# Patient Record
Sex: MALE | Race: WHITE | NOT HISPANIC OR LATINO | Employment: FULL TIME | URBAN - METROPOLITAN AREA
[De-identification: names, ages, dates, MRNs, and addresses within clinical notes are randomized per-mention and may not be internally consistent; named-entity substitution may affect disease eponyms.]

---

## 2021-04-08 DIAGNOSIS — Z23 ENCOUNTER FOR IMMUNIZATION: ICD-10-CM

## 2022-04-19 ENCOUNTER — CONSULT (OUTPATIENT)
Dept: INFECTIOUS DISEASES | Facility: CLINIC | Age: 57
End: 2022-04-19

## 2022-04-19 VITALS
SYSTOLIC BLOOD PRESSURE: 118 MMHG | OXYGEN SATURATION: 97 % | BODY MASS INDEX: 28 KG/M2 | HEIGHT: 71 IN | DIASTOLIC BLOOD PRESSURE: 72 MMHG | HEART RATE: 85 BPM | TEMPERATURE: 97.7 F | RESPIRATION RATE: 18 BRPM | WEIGHT: 200 LBS

## 2022-04-19 DIAGNOSIS — Z71.84 TRAVEL ADVICE ENCOUNTER: Primary | ICD-10-CM

## 2022-04-19 PROCEDURE — 90471 IMMUNIZATION ADMIN: CPT

## 2022-04-19 PROCEDURE — 90717 YELLOW FEVER VACCINE SUBQ: CPT

## 2022-04-19 PROCEDURE — 90691 TYPHOID VACCINE IM: CPT

## 2022-04-19 RX ORDER — CHLORAL HYDRATE 500 MG
2 CAPSULE ORAL DAILY
COMMUNITY
Start: 2021-12-08

## 2022-04-19 RX ORDER — LOSARTAN POTASSIUM 25 MG/1
TABLET ORAL
COMMUNITY
Start: 2022-03-18

## 2022-04-19 RX ORDER — CALCIPOTRIENE 50 UG/G
CREAM TOPICAL
COMMUNITY
Start: 2022-02-09

## 2022-04-19 RX ORDER — FENOFIBRATE 54 MG/1
TABLET ORAL
COMMUNITY

## 2022-04-19 RX ORDER — PANTOPRAZOLE SODIUM 40 MG/1
TABLET, DELAYED RELEASE ORAL
COMMUNITY
Start: 2022-03-15

## 2022-04-19 RX ORDER — BIMATOPROST 0.01 %
1 DROPS OPHTHALMIC (EYE) DAILY
COMMUNITY
Start: 2022-02-23

## 2022-04-19 RX ORDER — EPINEPHRINE 0.3 MG/.3ML
INJECTION SUBCUTANEOUS
COMMUNITY
Start: 2011-06-27

## 2022-04-19 RX ORDER — PREDNISONE 20 MG/1
40 TABLET ORAL 2 TIMES DAILY
COMMUNITY
Start: 2022-04-12

## 2022-04-19 NOTE — PROGRESS NOTES
[facility]    Patient is traveling to countries or areas within countries where immunizations are required or recommended:   [employer] [employer]    Patient states they will visit underdeveloped areas with poor sanitation Yes/No: Yes   Patient requires malaria prophylaxis Yes/No: Yes    No orders of the defined types were placed in this encounter    Needs only Yellow Fever and Typhoid    Patient instructed how to take medications Yes/No: Yes  Patient warned about side effects Yes/No: Yes  Patient declined

## 2024-06-08 ENCOUNTER — APPOINTMENT (EMERGENCY)
Dept: RADIOLOGY | Facility: HOSPITAL | Age: 59
End: 2024-06-08
Payer: COMMERCIAL

## 2024-06-08 ENCOUNTER — HOSPITAL ENCOUNTER (EMERGENCY)
Facility: HOSPITAL | Age: 59
Discharge: HOME | End: 2024-06-08
Attending: EMERGENCY MEDICINE
Payer: COMMERCIAL

## 2024-06-08 VITALS
SYSTOLIC BLOOD PRESSURE: 127 MMHG | OXYGEN SATURATION: 97 % | DIASTOLIC BLOOD PRESSURE: 71 MMHG | RESPIRATION RATE: 18 BRPM | TEMPERATURE: 98.1 F | HEART RATE: 70 BPM

## 2024-06-08 DIAGNOSIS — S61.209A AVULSION OF FINGER TIP, INITIAL ENCOUNTER: Primary | ICD-10-CM

## 2024-06-08 PROCEDURE — 96372 THER/PROPH/DIAG INJ SC/IM: CPT

## 2024-06-08 PROCEDURE — 90471 IMMUNIZATION ADMIN: CPT

## 2024-06-08 PROCEDURE — 99284 EMERGENCY DEPT VISIT MOD MDM: CPT | Performed by: EMERGENCY MEDICINE

## 2024-06-08 PROCEDURE — 90715 TDAP VACCINE 7 YRS/> IM: CPT | Performed by: EMERGENCY MEDICINE

## 2024-06-08 PROCEDURE — 99283 EMERGENCY DEPT VISIT LOW MDM: CPT

## 2024-06-08 PROCEDURE — 73140 X-RAY EXAM OF FINGER(S): CPT

## 2024-06-08 RX ORDER — KETOROLAC TROMETHAMINE 30 MG/ML
15 INJECTION, SOLUTION INTRAMUSCULAR; INTRAVENOUS ONCE
Status: COMPLETED | OUTPATIENT
Start: 2024-06-08 | End: 2024-06-08

## 2024-06-08 RX ORDER — OXYCODONE HYDROCHLORIDE 5 MG/1
5 TABLET ORAL ONCE
Status: COMPLETED | OUTPATIENT
Start: 2024-06-08 | End: 2024-06-08

## 2024-06-08 RX ORDER — CEPHALEXIN 250 MG/1
500 CAPSULE ORAL ONCE
Status: COMPLETED | OUTPATIENT
Start: 2024-06-08 | End: 2024-06-08

## 2024-06-08 RX ORDER — OXYCODONE HYDROCHLORIDE 5 MG/1
5 TABLET ORAL EVERY 6 HOURS PRN
Qty: 15 TABLET | Refills: 0 | Status: SHIPPED | OUTPATIENT
Start: 2024-06-08 | End: 2024-06-18

## 2024-06-08 RX ORDER — NAPROXEN 500 MG/1
500 TABLET ORAL 2 TIMES DAILY WITH MEALS
Qty: 30 TABLET | Refills: 0 | Status: SHIPPED | OUTPATIENT
Start: 2024-06-08

## 2024-06-08 RX ORDER — CEPHALEXIN 500 MG/1
500 CAPSULE ORAL EVERY 6 HOURS SCHEDULED
Qty: 28 CAPSULE | Refills: 0 | Status: SHIPPED | OUTPATIENT
Start: 2024-06-08 | End: 2024-06-15

## 2024-06-08 RX ADMIN — CEPHALEXIN 500 MG: 250 CAPSULE ORAL at 17:09

## 2024-06-08 RX ADMIN — KETOROLAC TROMETHAMINE 15 MG: 30 INJECTION, SOLUTION INTRAMUSCULAR; INTRAVENOUS at 17:02

## 2024-06-08 RX ADMIN — TETANUS TOXOID, REDUCED DIPHTHERIA TOXOID AND ACELLULAR PERTUSSIS VACCINE, ADSORBED 0.5 ML: 5; 2.5; 8; 8; 2.5 SUSPENSION INTRAMUSCULAR at 17:02

## 2024-06-08 RX ADMIN — OXYCODONE HYDROCHLORIDE 5 MG: 5 TABLET ORAL at 17:01

## 2024-06-08 NOTE — Clinical Note
David Apgar was seen and treated in our emergency department on 2024.            Light duty due to left hand injury    Diagnosis:     Paulino  .    He may return on this date:          If you have any questions or concerns, please don't hesitate to call.      Mattie Maldonado, DO    ______________________________           _______________          _______________  Hospital Representative                              Date                                Time

## 2024-06-08 NOTE — DISCHARGE INSTRUCTIONS
Keep your dressing in place, and keep it clean and dry.  If your dressing comes off, rewrap it with the petroleum gauze, then regular gauze, and then the stretchy bandaging.  Return to the emergency department for any new or worsening symptoms including worsening pain or numbness in the finger, redness spreading from the finger, fevers, or other concerning symptoms.

## 2024-06-08 NOTE — ED PROVIDER NOTES
History  Chief Complaint   Patient presents with    Finger Injury     Pt reports moving a piece of steel equipment and sliced part of left finger     58-year-old male presents for evaluation with a left finger injury.  Patient states that he sliced off the pad of his left third finger on a piece of metal.  He states that the bone was visible.  The patient did wrap up the finger at the time of the injury, and he placed the avulsed piece of tissue in a cup with ice water.  He currently endorses pain in the distal finger.  He denies any other symptoms or injuries at this time.        Prior to Admission Medications   Prescriptions Last Dose Informant Patient Reported? Taking?   Ascorbic Acid, Vitamin C, (VITAMIN C) 100 MG tablet   Yes No   Sig: Take 2 tablets by mouth   EPINEPHrine (EPIPEN JR) 0.15 mg/0.3 mL SOAJ   Yes No   Sig: Inject 0.3 mg into the shoulder, thigh, or buttocks once Indications: Life-Threatening Allergic Reaction.   EPINEPHrine (EPIPEN) 0.3 mg/0.3 mL SOAJ   Yes No   Sig: Inject as directed   Lumigan 0.01 % ophthalmic drops   Yes No   Sig: Administer 1 drop to both eyes daily   Omega-3 1000 MG CAPS   Yes No   Sig: Take 2 capsules by mouth daily   calcipotriene (DOVONEX) 0.005 % cream   Yes No   Sig: MIX EQUAL PARTS WITH FLUOROURACIL AND APPLY FOR 2 WEEKS   fenofibrate (TRICOR) 54 MG tablet   Yes No   Sig: Take by mouth   losartan (COZAAR) 25 mg tablet   Yes No   pantoprazole (PROTONIX) 40 mg tablet   Yes No   predniSONE 20 mg tablet   Yes No   Sig: Take 40 mg by mouth 2 (two) times a day      Facility-Administered Medications: None       Past Medical History:   Diagnosis Date    Sleep apnea        History reviewed. No pertinent surgical history.    History reviewed. No pertinent family history.  I have reviewed and agree with the history as documented.    E-Cigarette/Vaping     E-Cigarette/Vaping Substances     Social History     Tobacco Use    Smoking status: Every Day     Current packs/day: 0.50      Types: Cigarettes   Substance Use Topics    Alcohol use: Yes     Alcohol/week: 1.0 standard drink of alcohol     Types: 1 Glasses of wine per week    Drug use: No       Review of Systems   Skin:  Positive for wound.   All other systems reviewed and are negative.      Physical Exam  Physical Exam  Vitals and nursing note reviewed.   Constitutional:       General: He is awake. He is not in acute distress.     Appearance: He is not toxic-appearing.   HENT:      Head: Normocephalic and atraumatic.   Eyes:      General: Vision grossly intact. Gaze aligned appropriately.   Cardiovascular:      Rate and Rhythm: Normal rate and regular rhythm.   Pulmonary:      Effort: Pulmonary effort is normal. No respiratory distress.   Musculoskeletal:      Cervical back: Full passive range of motion without pain and neck supple.      Comments: Distal palmar aspect of left third finger with an avulsion injury down to bone.  Venous bleeding noted.  See media image below.   Skin:     General: Skin is warm and dry.   Neurological:      General: No focal deficit present.      Mental Status: He is alert and oriented to person, place, and time.         Vital Signs  ED Triage Vitals [06/08/24 1620]   Temperature Pulse Respirations Blood Pressure SpO2   98.1 °F (36.7 °C) 70 18 127/71 97 %      Temp src Heart Rate Source Patient Position - Orthostatic VS BP Location FiO2 (%)   -- Monitor Sitting Right arm --      Pain Score       --           Vitals:    06/08/24 1620   BP: 127/71   Pulse: 70   Patient Position - Orthostatic VS: Sitting         Visual Acuity      ED Medications  Medications   tetanus-diphtheria-acellular pertussis (BOOSTRIX) IM injection 0.5 mL (has no administration in time range)   oxyCODONE (ROXICODONE) IR tablet 5 mg (has no administration in time range)   ketorolac (TORADOL) injection 15 mg (has no administration in time range)       Diagnostic Studies  Results Reviewed       None                   XR finger third  digit-middle LEFT    (Results Pending)              Procedures  Procedures         ED Course  ED Course as of 06/08/24 2208   Sat Jun 08, 2024   1628 Per chart review, patient's last Tdap was given in 2016.   1757 Patient's left third digit was irrigated with saline syringes, and was then wrapped with Xeroform, gauze, and Coban wrap.  Will give patient extra supplies in case this bandaging falls off.  Will send prescriptions for Keflex, oxycodone, and naproxen.  Will have patient follow-up with hand surgery in the next 3 to 5 days.                                             Medical Decision Making  Amount and/or Complexity of Data Reviewed  Radiology: ordered and independent interpretation performed.    Risk  Prescription drug management.             Disposition  Final diagnoses:   None     ED Disposition       None          Follow-up Information    None         Patient's Medications   Discharge Prescriptions    No medications on file       No discharge procedures on file.    PDMP Review       None            ED Provider  Electronically Signed by           was updated at this time, and he was given multimodal analgesia. Wound was irrigated and dressed per the above documentation, and patient was given keflex for empiric treatment. Patient given follow up with orthopedic hand surgeon. He was discharged home in stable condition with symptomatic care instructions and strict ED return precautions.     Amount and/or Complexity of Data Reviewed  Radiology: ordered and independent interpretation performed.    Risk  Prescription drug management.             Disposition  Final diagnoses:   Avulsion of finger tip, initial encounter     Time reflects when diagnosis was documented in both MDM as applicable and the Disposition within this note       Time User Action Codes Description Comment    2024  6:00 PM Mattie Maldonado Add [S61.209A] Avulsion of finger tip, initial encounter     2024  6:01 PM Mattie Maldonado Modify [S61.209A] Avulsion of finger tip, initial encounter           ED Disposition       ED Disposition   Discharge    Condition   Stable    Date/Time   Sat 2024  6:00 PM    Comment   Paulino Apgar discharge to home/self care.                   Follow-up Information       Follow up With Specialties Details Why Contact Info Additional Information    Antonio Alonso MD Orthopedic Surgery, Hand Surgery Schedule an appointment as soon as possible for a visit in 5 days  [address] [place] [id number]  [phone]       Critical access hospital Emergency Department Emergency Medicine Go to  If symptoms worsen [facility] [address]  [phone] Critical access hospital Emergency Department, [facility], [place], [address]            Discharge Medication List as of 2024  6:04 PM        START taking these medications    Details   cephalexin (KEFLEX) 500 mg capsule Take 1 capsule (500 mg total) by mouth every 6 (six) hours for 7 days, Starting Sat 2024, Until Sat 6/15/2024, Normal      naproxen  (Naprosyn) 500 mg tablet Take 1 tablet (500 mg total) by mouth 2 (two) times a day with meals, Starting Sat 6/8/2024, Normal      oxyCODONE (Roxicodone) 5 immediate release tablet Take 1 tablet (5 mg total) by mouth every 6 (six) hours as needed for severe pain for up to 10 days Max Daily Amount: 20 mg, Starting Sat 6/8/2024, Until Tue 6/18/2024 at 2359, Normal           CONTINUE these medications which have NOT CHANGED    Details   Ascorbic Acid, Vitamin C, (VITAMIN C) 100 MG tablet Take 2 tablets by mouth, Historical Med      calcipotriene (DOVONEX) 0.005 % cream MIX EQUAL PARTS WITH FLUOROURACIL AND APPLY FOR 2 WEEKS, Historical Med      EPINEPHrine (EPIPEN JR) 0.15 mg/0.3 mL SOAJ Inject 0.3 mg into the shoulder, thigh, or buttocks once Indications: Life-Threatening Allergic Reaction., Historical Med      EPINEPHrine (EPIPEN) 0.3 mg/0.3 mL SOAJ Inject as directed, Starting Mon 6/27/2011, Historical Med      fenofibrate (TRICOR) 54 MG tablet Take by mouth, Historical Med      losartan (COZAAR) 25 mg tablet Starting Fri 3/18/2022, Historical Med      Lumigan 0.01 % ophthalmic drops Administer 1 drop to both eyes daily, Starting Wed 2/23/2022, Historical Med      Omega-3 1000 MG CAPS Take 2 capsules by mouth daily, Starting Wed 12/8/2021, Historical Med      pantoprazole (PROTONIX) 40 mg tablet Starting Tue 3/15/2022, Historical Med      predniSONE 20 mg tablet Take 40 mg by mouth 2 (two) times a day, Starting Tue 4/12/2022, Historical Med                 PDMP Review       None            ED Provider  Electronically Signed by             Mattie Maldonado, DO  06/29/24 5725

## 2024-06-11 ENCOUNTER — OFFICE VISIT (OUTPATIENT)
Dept: OBGYN CLINIC | Facility: CLINIC | Age: 59
End: 2024-06-11
Payer: COMMERCIAL

## 2024-06-11 VITALS
WEIGHT: 200 LBS | SYSTOLIC BLOOD PRESSURE: 150 MMHG | HEIGHT: 71 IN | BODY MASS INDEX: 28 KG/M2 | DIASTOLIC BLOOD PRESSURE: 60 MMHG

## 2024-06-11 DIAGNOSIS — S61.209A AVULSION OF FINGER TIP, INITIAL ENCOUNTER: ICD-10-CM

## 2024-06-11 PROCEDURE — 99203 OFFICE O/P NEW LOW 30 MIN: CPT | Performed by: SURGERY

## 2024-06-11 RX ORDER — AZELASTINE 1 MG/ML
2 SPRAY, METERED NASAL 2 TIMES DAILY
COMMUNITY
Start: 2024-06-04

## 2024-06-11 RX ORDER — CHLORTHALIDONE 25 MG/1
12.5 TABLET ORAL DAILY
COMMUNITY
Start: 2024-06-04

## 2024-06-11 RX ORDER — FLUOXETINE 10 MG/1
10 CAPSULE ORAL DAILY
COMMUNITY
Start: 2024-01-26 | End: 2025-01-25

## 2024-06-11 RX ORDER — POTASSIUM CHLORIDE 20 MEQ/1
20 TABLET, EXTENDED RELEASE ORAL DAILY
COMMUNITY

## 2024-06-11 NOTE — PROGRESS NOTES
Assessment    Left middle fingertip volar pad laceration  DOI:  2024      Plan    Wound dressed with Xeroform, gauze and Coban.  May change this daily and wash directly with soapy water and pat dry.  Over-the-counter pain medications as needed.  No firm grasping or lifting.  Gentle finger range of motion is encouraged.  Continue oral antibiotics to completion.  Work note provided.  Follow-up in 7-10 days for re-evaluation.        Subjective     HPI    Patient ID:  David Apgar is a left hand dominant 58 y.o. male here for evaluation of the left middle finger.  According to the patient, few days ago he was using a 400 pound wood  and left middle finger got caught and the fingertip was sliced.  He presented to the ER where the area was washed out and dressed with Xeroform gauze and Coban.  There were no fractures identified.  He was discharged on oral Keflex.  Today he states he has throbbing pain of the middle finger especially when it is in full extension.  No fever or chills.          The following portions of the patient's history were reviewed and updated as appropriate: allergies, current medications, past family history, past medical history, past social history, past surgical history, and problem list.    Review of Systems     Objective    Imaging:  Left middle finger x-rays 2024    FINDINGS:     No acute fracture or dislocation.     Mild degenerative changes of the distal radial ulnar joint and first CMC joint.     Mild deformity of the fifth metacarpal may be related to old trauma.     No lytic or blastic osseous lesion.     Soft tissue defect and swelling of the distal third finger.     IMPRESSION:     1. No acute osseous abnormality.  2. Soft tissue defect of the distal third finger correlating with patient's known injury.  3. Degenerative changes as detailed above.    Physical Exam     Vitals:    24 0921   BP: 150/60     General appearance:  NAD   Cardiac:  Regular rate  Lungs:   Unlabored breathing  Abdomen:  Non-distended    Orthopedic Examination:  Left middle finger   Soft tissue laceration/avulsion of the distal finger pad with healthy appearing, bleeding tissue.  There is no pus drainage.  There is mild swelling, no fusiform swelling.  Just short of full flexion, he can get to full extension at all joints.  Strength testing deferred  Sensation intact light touch  Palpable radial pulse.

## 2024-12-28 ENCOUNTER — APPOINTMENT (EMERGENCY)
Dept: RADIOLOGY | Facility: HOSPITAL | Age: 59
End: 2024-12-28
Payer: COMMERCIAL

## 2024-12-28 ENCOUNTER — HOSPITAL ENCOUNTER (EMERGENCY)
Facility: HOSPITAL | Age: 59
Discharge: HOME | End: 2024-12-28
Attending: EMERGENCY MEDICINE | Admitting: EMERGENCY MEDICINE
Payer: COMMERCIAL

## 2024-12-28 VITALS
DIASTOLIC BLOOD PRESSURE: 75 MMHG | TEMPERATURE: 97.8 F | SYSTOLIC BLOOD PRESSURE: 160 MMHG | HEART RATE: 79 BPM | OXYGEN SATURATION: 96 % | RESPIRATION RATE: 20 BRPM

## 2024-12-28 DIAGNOSIS — S61.219A FINGER LACERATION: Primary | ICD-10-CM

## 2024-12-28 DIAGNOSIS — S62.609B OPEN FRACTURE OF FINGER: ICD-10-CM

## 2024-12-28 PROCEDURE — 99284 EMERGENCY DEPT VISIT MOD MDM: CPT

## 2024-12-28 PROCEDURE — 73140 X-RAY EXAM OF FINGER(S): CPT

## 2024-12-28 PROCEDURE — 12002 RPR S/N/AX/GEN/TRNK2.6-7.5CM: CPT

## 2024-12-28 PROCEDURE — 99283 EMERGENCY DEPT VISIT LOW MDM: CPT

## 2024-12-28 RX ORDER — SULFAMETHOXAZOLE AND TRIMETHOPRIM 800; 160 MG/1; MG/1
1 TABLET ORAL ONCE
Status: COMPLETED | OUTPATIENT
Start: 2024-12-28 | End: 2024-12-28

## 2024-12-28 RX ORDER — GINSENG 100 MG
1 CAPSULE ORAL ONCE
Status: COMPLETED | OUTPATIENT
Start: 2024-12-28 | End: 2024-12-28

## 2024-12-28 RX ORDER — OXYCODONE HYDROCHLORIDE 5 MG/1
5 TABLET ORAL ONCE
Refills: 0 | Status: COMPLETED | OUTPATIENT
Start: 2024-12-28 | End: 2024-12-28

## 2024-12-28 RX ORDER — SULFAMETHOXAZOLE AND TRIMETHOPRIM 800; 160 MG/1; MG/1
1 TABLET ORAL 2 TIMES DAILY
Qty: 14 TABLET | Refills: 0 | Status: SHIPPED | OUTPATIENT
Start: 2024-12-28 | End: 2025-01-04

## 2024-12-28 RX ORDER — LIDOCAINE HYDROCHLORIDE 10 MG/ML
10 INJECTION, SOLUTION EPIDURAL; INFILTRATION; INTRACAUDAL; PERINEURAL ONCE
Status: COMPLETED | OUTPATIENT
Start: 2024-12-28 | End: 2024-12-28

## 2024-12-28 RX ADMIN — OXYCODONE 5 MG: 5 TABLET ORAL at 19:18

## 2024-12-28 RX ADMIN — LIDOCAINE HYDROCHLORIDE 10 ML: 10 INJECTION, SOLUTION EPIDURAL; INFILTRATION; INTRACAUDAL; PERINEURAL at 19:17

## 2024-12-28 RX ADMIN — SULFAMETHOXAZOLE AND TRIMETHOPRIM 1 TABLET: 800; 160 TABLET ORAL at 20:39

## 2024-12-28 RX ADMIN — BACITRACIN ZINC 1 SMALL APPLICATION: 500 OINTMENT TOPICAL at 19:18

## 2024-12-29 NOTE — ED PROVIDER NOTES
Time reflects when diagnosis was documented in both MDM as applicable and the Disposition within this note       Time User Action Codes Description Comment    2024  8:36 PM Paulino Ramirez Add [S61.219A] Finger laceration     2024  8:37 PM Paulino Ramirez [S62.609B] Open fracture of finger           ED Disposition       ED Disposition   Discharge    Condition   Stable    Date/Time   Sat Dec 28, 2024  8:41 PM    Comment   Paulino Apgar discharge to home/self care.                   Assessment & Plan       Medical Decision Making  59-year-old male presents to the ED for evaluation of a laceration to his right second finger, this happened while patient was chopping wood with an ax approximately 30 minutes prior to ED arrival.  ED interpretation of x-rays were concerning for a nondisplaced fracture of proximal phalanx.  Discussed case with hand surgery who is in agreement with plan for extensive irrigation, suture repair, course of Bactrim, splint, follow-up with hand surgery.  Had an extensive discussion with patient regarding wound care at home.  ED return precautions discussed with patient.  Ambulatory to hand surgery placed for further evaluation and management.  Emphasized importance of close follow-up with hand surgery.  Patient verbalized understanding and agreement with plan.    Amount and/or Complexity of Data Reviewed  Radiology: ordered and independent interpretation performed. Decision-making details documented in ED Course.    Risk  OTC drugs.  Prescription drug management.        ED Course as of 24   Sat Dec 28, 2024   2037 XR finger second digit-index RIGHT  X-rays concerning for nondisplaced proximal phalanx fracture.  Discussed case with Dr. Valdez with hand surgery via [facility].  Patient okay for extensive irrigation, suture repair, splint, course of Bactrim, outpatient follow-up with hand surgery.       Medications   oxyCODONE (ROXICODONE) IR tablet 5 mg (5 mg Oral Given  12/28/24 1918)   lidocaine (PF) (XYLOCAINE-MPF) 1 % injection 10 mL (10 mL Infiltration Given by Other 12/28/24 1917)   bacitracin topical ointment 1 small application (1 small application Topical Given 12/28/24 1918)   sulfamethoxazole-trimethoprim (BACTRIM DS) 800-160 mg per tablet 1 tablet (1 tablet Oral Given 12/28/24 2039)       ED Risk Strat Scores                          SBIRT 20yo+      Flowsheet Row Most Recent Value   Initial Alcohol Screen: US AUDIT-C     1. How often do you have a drink containing alcohol? 0 Filed at: 12/28/2024 1900   2. How many drinks containing alcohol do you have on a typical day you are drinking?  0 Filed at: 12/28/2024 1900   3a. Male UNDER 65: How often do you have five or more drinks on one occasion? 0 Filed at: 12/28/2024 1900   Audit-C Score 0 Filed at: 12/28/2024 1900   LOIS: How many times in the past year have you...    Used an illegal drug or used a prescription medication for non-medical reasons? Never Filed at: 12/28/2024 1900                            History of Present Illness       Chief Complaint   Patient presents with    Extremity Laceration     Pt c/o right hand laceration 30min PTA. Pt was cutting firewood and axe slipped and cut his right hand. No meds PTA.        Past Medical History:   Diagnosis Date    Acid reflux     Glaucoma     Hypertension     Sleep apnea       Past Surgical History:   Procedure Laterality Date    HAND SURGERY      trigger thumb    MASS EXCISION      melanoma removal      Family History   Family history unknown: Yes      Social History     Tobacco Use    Smoking status: Every Day     Current packs/day: 0.50     Types: Cigarettes   Substance Use Topics    Alcohol use: Yes     Alcohol/week: 1.0 standard drink of alcohol     Types: 1 Glasses of wine per week    Drug use: No      E-Cigarette/Vaping      E-Cigarette/Vaping Substances      I have reviewed and agree with the history as documented.     This is a 59-year-old male who presents to  the ED for evaluation of a laceration to his right second finger from approximately 30 minutes prior.  Patient reports he was cutting wood with an ax when he accidentally lacerated his index finger.  Reports bleeding was controlled with direct pressure.  He denies any weakness or loss of sensation in his finger, denies any other injuries.  He denies any other acute concerns or complaints at this time.  He is left-hand dominant.  Per chart review, last Tdap was earlier this year.          Review of Systems   Skin:  Positive for wound (laceration of right 2nd finger).   All other systems reviewed and are negative.          Objective       ED Triage Vitals   Temperature Pulse Blood Pressure Respirations SpO2 Patient Position - Orthostatic VS   12/28/24 1859 12/28/24 1859 12/28/24 1859 12/28/24 1859 12/28/24 1859 12/28/24 1859   97.8 °F (36.6 °C) 79 160/75 20 96 % Sitting      Temp Source Heart Rate Source BP Location FiO2 (%) Pain Score    12/28/24 1859 12/28/24 1859 12/28/24 1859 -- 12/28/24 1918    Oral Monitor Right arm  8      Vitals      Date and Time Temp Pulse SpO2 Resp BP Pain Score FACES Pain Rating User   12/28/24 1918 -- -- -- -- -- 8 -- DG   12/28/24 1859 97.8 °F (36.6 °C) 79 96 % 20 160/75 -- -- BM            Physical Exam  Vitals and nursing note reviewed.   Constitutional:       General: He is not in acute distress.     Appearance: Normal appearance. He is well-developed. He is not toxic-appearing or diaphoretic.   HENT:      Head: Normocephalic and atraumatic.   Eyes:      Conjunctiva/sclera: Conjunctivae normal.   Cardiovascular:      Rate and Rhythm: Normal rate and regular rhythm.      Heart sounds: No murmur heard.  Pulmonary:      Effort: Pulmonary effort is normal. No respiratory distress.      Breath sounds: Normal breath sounds.   Abdominal:      Palpations: Abdomen is soft.      Tenderness: There is no abdominal tenderness.   Musculoskeletal:         General: No swelling.        Hands:        Cervical back: Normal range of motion and neck supple. No rigidity.      Comments: Patient is able to fully flex and extend at right second MCP, PIP,DIP.  Proximal and distal sensation intact in right second finger, cap refill less than 2 seconds.   Skin:     General: Skin is warm and dry.      Capillary Refill: Capillary refill takes less than 2 seconds.   Neurological:      General: No focal deficit present.      Mental Status: He is alert and oriented to person, place, and time.   Psychiatric:         Mood and Affect: Mood normal.         Results Reviewed       None            XR finger second digit-index RIGHT   ED Interpretation by Paulino Ramirez PA-C (12/28 1939)   ED Interpretation: Small area of lucency over proximal phalanx.  Concerning for nondisplaced fracture.  This reading is not in concordance with the AI interpretation.          Universal Protocol:  procedure performed by consultantConsent: Verbal consent obtained.  Risks and benefits: risks, benefits and alternatives were discussed  Consent given by: patient  Patient understanding: patient states understanding of the procedure being performed  Patient consent: the patient's understanding of the procedure matches consent given  Laceration repair    Date/Time: 12/28/2024 7:45 PM    Performed by: Paulino Ramirez PA-C  Authorized by: Paulino Ramirez PA-C  Laceration length: 3 cm  Tendon involvement: none  Nerve involvement: none  Vascular damage: no  Anesthesia: digital block    Anesthesia:  Local Anesthetic: lidocaine 1% without epinephrine  Anesthetic total: 4 mL      Procedure Details:  Preparation: Patient was prepped and draped in the usual sterile fashion.  Irrigation solution: Sterile water.  Irrigation method: syringe  Amount of cleaning: extensive (Approximately 500 cc sterile water.)  Debridement: none  Degree of undermining: none  Skin closure: 4-0 nylon  Number of sutures: 7  Approximation: loose  Approximation difficulty: simple  Dressing:  antibiotic ointment, gauze roll and splint  Patient tolerance: patient tolerated the procedure well with no immediate complications          ED Medication and Procedure Management   Prior to Admission Medications   Prescriptions Last Dose Informant Patient Reported? Taking?   Ascorbic Acid, Vitamin C, (VITAMIN C) 100 MG tablet   Yes No   Sig: Take 2 tablets by mouth   EPINEPHrine (EPIPEN JR) 0.15 mg/0.3 mL SOAJ   Yes No   Sig: Inject 0.3 mg into the shoulder, thigh, or buttocks once Indications: Life-Threatening Allergic Reaction.   EPINEPHrine (EPIPEN) 0.3 mg/0.3 mL SOAJ   Yes No   Sig: Inject as directed   FLUoxetine (PROzac) 10 mg capsule   Yes No   Sig: Take 10 mg by mouth daily   Lumigan 0.01 % ophthalmic drops   Yes No   Sig: Administer 1 drop to both eyes daily   Omega-3 1000 MG CAPS   Yes No   Sig: Take 2 capsules by mouth daily   azelastine (ASTELIN) 0.1 % nasal spray   Yes No   Si sprays into each nostril 2 (two) times a day   calcipotriene (DOVONEX) 0.005 % cream   Yes No   Sig: MIX EQUAL PARTS WITH FLUOROURACIL AND APPLY FOR 2 WEEKS   chlorthalidone 25 mg tablet   Yes No   Sig: Take 12.5 mg by mouth daily   fenofibrate (TRICOR) 54 MG tablet   Yes No   Sig: Take by mouth   losartan (COZAAR) 25 mg tablet   Yes No   naproxen (Naprosyn) 500 mg tablet   No No   Sig: Take 1 tablet (500 mg total) by mouth 2 (two) times a day with meals   pantoprazole (PROTONIX) 40 mg tablet   Yes No   potassium chloride (Klor-Con M20) 20 mEq tablet   Yes No   Sig: Take 20 mEq by mouth daily   predniSONE 20 mg tablet   Yes No   Sig: Take 40 mg by mouth 2 (two) times a day      Facility-Administered Medications: None     Discharge Medication List as of 2024  8:42 PM        START taking these medications    Details   sulfamethoxazole-trimethoprim (BACTRIM DS) 800-160 mg per tablet Take 1 tablet by mouth 2 (two) times a day for 7 days smx-tmp DS (BACTRIM) 800-160 mg tabs (1tab q12 D10), Starting Sat 2024, Until Sat  1/4/2025, Normal           CONTINUE these medications which have NOT CHANGED    Details   Ascorbic Acid, Vitamin C, (VITAMIN C) 100 MG tablet Take 2 tablets by mouth, Historical Med      azelastine (ASTELIN) 0.1 % nasal spray 2 sprays into each nostril 2 (two) times a day, Starting Tue 6/4/2024, Historical Med      calcipotriene (DOVONEX) 0.005 % cream MIX EQUAL PARTS WITH FLUOROURACIL AND APPLY FOR 2 WEEKS, Historical Med      chlorthalidone 25 mg tablet Take 12.5 mg by mouth daily, Starting Tue 6/4/2024, Historical Med      EPINEPHrine (EPIPEN JR) 0.15 mg/0.3 mL SOAJ Inject 0.3 mg into the shoulder, thigh, or buttocks once Indications: Life-Threatening Allergic Reaction., Historical Med      EPINEPHrine (EPIPEN) 0.3 mg/0.3 mL SOAJ Inject as directed, Starting Mon 6/27/2011, Historical Med      fenofibrate (TRICOR) 54 MG tablet Take by mouth, Historical Med      FLUoxetine (PROzac) 10 mg capsule Take 10 mg by mouth daily, Starting Fri 1/26/2024, Until Sat 1/25/2025, Historical Med      losartan (COZAAR) 25 mg tablet Starting Fri 3/18/2022, Historical Med      Lumigan 0.01 % ophthalmic drops Administer 1 drop to both eyes daily, Starting Wed 2/23/2022, Historical Med      naproxen (Naprosyn) 500 mg tablet Take 1 tablet (500 mg total) by mouth 2 (two) times a day with meals, Starting Sat 6/8/2024, Normal      Omega-3 1000 MG CAPS Take 2 capsules by mouth daily, Starting Wed 12/8/2021, Historical Med      pantoprazole (PROTONIX) 40 mg tablet Starting Tue 3/15/2022, Historical Med      potassium chloride (Klor-Con M20) 20 mEq tablet Take 20 mEq by mouth daily, Historical Med      predniSONE 20 mg tablet Take 40 mg by mouth 2 (two) times a day, Starting Tue 4/12/2022, Historical Med             ED SEPSIS DOCUMENTATION   Time reflects when diagnosis was documented in both MDM as applicable and the Disposition within this note       Time User Action Codes Description Comment    12/28/2024  8:36 PM Paulino Ramirez  [S61.219A] Finger laceration     12/28/2024  8:37 PM Paulino Ramirez Add [S62.609B] Open fracture of finger                  Paulino Ramirez PA-C  12/28/24 [id number]

## 2024-12-29 NOTE — DISCHARGE INSTRUCTIONS
Please keep your wound dry and covered until Monday morning.  At that time you may change the bandages.    After that time you may also gently clean the wound with soap and water.  Please continue to wear your splint.  Avoid submerging your wound in any sitting water until stitches are removed and you are cleared by hand surgery.  Please follow-up with hand surgery for further evaluation and management and for suture removal in approximately 7 days.  Return to the ED if you develop any new or worsening symptoms or develop signs of infection as discussed prior to discharge.

## 2025-01-03 ENCOUNTER — OFFICE VISIT (OUTPATIENT)
Dept: OBGYN CLINIC | Facility: CLINIC | Age: 60
End: 2025-01-03
Payer: COMMERCIAL

## 2025-01-03 VITALS — WEIGHT: 200 LBS | BODY MASS INDEX: 28 KG/M2 | HEIGHT: 71 IN

## 2025-01-03 DIAGNOSIS — S61.210A LACERATION OF RIGHT INDEX FINGER WITHOUT FOREIGN BODY WITHOUT DAMAGE TO NAIL, INITIAL ENCOUNTER: Primary | ICD-10-CM

## 2025-01-03 PROCEDURE — 99213 OFFICE O/P EST LOW 20 MIN: CPT | Performed by: STUDENT IN AN ORGANIZED HEALTH CARE EDUCATION/TRAINING PROGRAM

## 2025-01-03 NOTE — PROGRESS NOTES
ORTHOPAEDIC HAND, WRIST, AND ELBOW OFFICE  VISIT      ASSESSMENT/PLAN:      Diagnoses and all orders for this visit:    Laceration of right index finger without foreign body without damage to nail, initial encounter  -     Ambulatory Referral to Orthopedic Surgery  -     Suture removal          59 y.o. male with right index finger laceration, DOI 12/28/24    X-rays were reviewed in the office today. On exam, the laceration is healing well. There is no erythema or signs of infection. Discussed due to the area (dorsum of finger that may stretch with flexion) and smoking I would recommend leaving the sutures in for another week. The patient elected to proceed with suture removal today and this was performed in the office without any complications. Discussed if there is any issues with the wound to contact the office. He may keep it covered with a band-aide. He will follow up in 10 days for repeat evaluation.      Follow Up:  10 days       To Do Next Visit:  Re-evaluation of current issue          Demlar Dias MD  Attending, Orthopaedic Surgery  Hand, Wrist, and Elbow Surgery  [facility]    ______________________________________________________________________________________________    CHIEF COMPLAINT:  Chief Complaint   Patient presents with   • Right Index Finger - Follow-up       SUBJECTIVE:  Patient is a 59 y.o. LHD male who presents today for evaluation and treatment of right index finger laceration. The patient states on 12/28/24 he accidentally cut his finger with an axe while cutting wood. He presented to the ED after the injury where x-rays were taken and the laceration was repaired. He was discharged on Bactrim. He has been compliant with dressing changes.     Occupation: maintenance     I have personally reviewed all the relevant PMH, PSH, SH, FH, Medications and allergies      PAST MEDICAL HISTORY:  Past Medical History:   Diagnosis Date   • Acid reflux    • Glaucoma    •  Hypertension    • Sleep apnea        PAST SURGICAL HISTORY:  Past Surgical History:   Procedure Laterality Date   • HAND SURGERY      trigger thumb   • MASS EXCISION      melanoma removal       FAMILY HISTORY:  Family History   Family history unknown: Yes       SOCIAL HISTORY:  Social History     Tobacco Use   • Smoking status: Every Day     Current packs/day: 0.50     Types: Cigarettes   Substance Use Topics   • Alcohol use: Yes     Alcohol/week: 1.0 standard drink of alcohol     Types: 1 Glasses of wine per week   • Drug use: No       MEDICATIONS:    Current Outpatient Medications:   •  Ascorbic Acid, Vitamin C, (VITAMIN C) 100 MG tablet, Take 2 tablets by mouth, Disp: , Rfl:   •  azelastine (ASTELIN) 0.1 % nasal spray, 2 sprays into each nostril 2 (two) times a day, Disp: , Rfl:   •  calcipotriene (DOVONEX) 0.005 % cream, MIX EQUAL PARTS WITH FLUOROURACIL AND APPLY FOR 2 WEEKS, Disp: , Rfl:   •  chlorthalidone 25 mg tablet, Take 12.5 mg by mouth daily, Disp: , Rfl:   •  EPINEPHrine (EPIPEN JR) 0.15 mg/0.3 mL SOAJ, Inject 0.3 mg into the shoulder, thigh, or buttocks once Indications: Life-Threatening Allergic Reaction., Disp: , Rfl:   •  EPINEPHrine (EPIPEN) 0.3 mg/0.3 mL SOAJ, Inject as directed, Disp: , Rfl:   •  fenofibrate (TRICOR) 54 MG tablet, Take by mouth, Disp: , Rfl:   •  FLUoxetine (PROzac) 10 mg capsule, Take 10 mg by mouth daily, Disp: , Rfl:   •  losartan (COZAAR) 25 mg tablet, , Disp: , Rfl:   •  Lumigan 0.01 % ophthalmic drops, Administer 1 drop to both eyes daily, Disp: , Rfl:   •  naproxen (Naprosyn) 500 mg tablet, Take 1 tablet (500 mg total) by mouth 2 (two) times a day with meals, Disp: 30 tablet, Rfl: 0  •  Omega-3 1000 MG CAPS, Take 2 capsules by mouth daily, Disp: , Rfl:   •  pantoprazole (PROTONIX) 40 mg tablet, , Disp: , Rfl:   •  potassium chloride (Klor-Con M20) 20 mEq tablet, Take 20 mEq by mouth daily, Disp: , Rfl:   •  predniSONE 20 mg tablet, Take 40 mg by mouth 2 (two) times a day,  Disp: , Rfl:   •  sulfamethoxazole-trimethoprim (BACTRIM DS) 800-160 mg per tablet, Take 1 tablet by mouth 2 (two) times a day for 7 days smx-tmp DS (BACTRIM) 800-160 mg tabs (1tab q12 D10), Disp: 14 tablet, Rfl: 0    ALLERGIES:  Allergies   Allergen Reactions   • Bee Venom Anaphylaxis   • Rituximab Other (See Comments)     Pt experienced Rigors, chills           REVIEW OF SYSTEMS:  Musculoskeletal:        As noted in HPI.   All other systems reviewed and are negative.    VITALS:  There were no vitals filed for this visit.    LABS:  HgA1c:   Lab Results   Component Value Date    HGBA1C 5.3 08/14/2024     BMP:   Lab Results   Component Value Date    CALCIUM 9.6 08/14/2024    K 4.0 08/14/2024    CO2 27 08/14/2024     08/14/2024    BUN 14 08/14/2024    CREATININE 0.94 08/14/2024       _____________________________________________________  PHYSICAL EXAMINATION:  General: Well developed and well nourished, alert & oriented x 3, appears comfortable  Psychiatric: Normal  HEENT: Normocephalic, Atraumatic Trachea Midline, No torticollis  Pulmonary: No audible wheezing or respiratory distress   Abdomen/GI: Non tender, non distended   Cardiovascular: No pitting edema, 2+ radial pulse   Skin: No Masses, No Erythema, No Fluctuation, No Ulcerations  Neurovascular: Sensation Intact to the Median, Ulnar, Radial Nerve, Motor Intact to the Median, Ulnar, Radial Nerve, and Pulses Intact  Musculoskeletal: Normal, except as noted in detailed exam and in HPI.      MUSCULOSKELETAL EXAMINATION:  Right index finger  1.5 cm transverse laceration over radial  5/5 PIP and MCP ext  No laceration volarly   No drainage  No erythema  Pulp to palm 3 cm      ___________________________________________________  STUDIES REVIEWED:  Xrays of the right index finger were reviewed and independently interpreted in PACS by Dr. Dias and demonstrate no fractures or dislocations.          PROCEDURES PERFORMED:  Suture removal    Date/Time: 1/3/2025  9:30 AM    Performed by: Delmar Dias MD  Authorized by: Delmar Dias MD  [place] Protocol:  procedure performed by consultantConsent: Verbal consent obtained.  Consent given by: patient  Patient identity confirmed: verbally with patient      Patient location:  Clinic  Location:     Laterality:  Right    Location:  Upper extremity    Upper extremity location:  Hand    Hand location:  R index finger  Procedure details:     Tools used:  Suture removal kit    Wound appearance:  No sign(s) of infection  Post-procedure details:     Post-removal:  Band-Aid applied    Patient tolerance of procedure:  Tolerated well, no immediate complications         _____________________________________________________      Scribe Attestation    I,:  Salome Ahumada MA am acting as a scribe while in the presence of the attending physician.:       I,:  Delmar Dias MD personally performed the services described in this documentation    as scribed in my presence.: